# Patient Record
Sex: MALE | Race: WHITE
[De-identification: names, ages, dates, MRNs, and addresses within clinical notes are randomized per-mention and may not be internally consistent; named-entity substitution may affect disease eponyms.]

---

## 2017-01-05 ENCOUNTER — HOSPITAL ENCOUNTER (OUTPATIENT)
Dept: HOSPITAL 33 - ED | Age: 82
Setting detail: OBSERVATION
LOS: 1 days | Discharge: HOME | End: 2017-01-06
Attending: FAMILY MEDICINE | Admitting: FAMILY MEDICINE
Payer: MEDICARE

## 2017-01-05 DIAGNOSIS — R55: Primary | ICD-10-CM

## 2017-01-05 LAB
ANION GAP SERPL CALC-SCNC: 11.3 MEQ/L (ref 5–15)
BASOPHILS NFR BLD AUTO: 0.2 % (ref 0–0.4)
BUN SERPL-MCNC: 19 MG/DL (ref 9–20)
CHLORIDE SERPL-SCNC: 106 MEQ/L (ref 98–107)
CO2 SERPL-SCNC: 28 MEQ/L (ref 21–32)
GLUCOSE SERPL-MCNC: 139 MG/DL (ref 70–110)
MCH RBC QN AUTO: 32.7 PG (ref 26–32)
NEUTROPHILS NFR BLD AUTO: 88.4 % (ref 36–66)
PLATELET # BLD AUTO: 239 K/MM3 (ref 150–450)
POTASSIUM SERPLBLD-SCNC: 3.5 MEQ/L (ref 3.5–5.1)
RBC # BLD AUTO: 4.1 M/MM3 (ref 4.1–5.6)
SODIUM SERPL-SCNC: 142 MEQ/L (ref 136–145)
WBC # BLD AUTO: 11.4 K/MM3 (ref 4–10.5)

## 2017-01-05 PROCEDURE — 93268 ECG RECORD/REVIEW: CPT

## 2017-01-05 PROCEDURE — 71020: CPT

## 2017-01-05 PROCEDURE — 85025 COMPLETE CBC W/AUTO DIFF WBC: CPT

## 2017-01-05 PROCEDURE — 36415 COLL VENOUS BLD VENIPUNCTURE: CPT

## 2017-01-05 PROCEDURE — 36000 PLACE NEEDLE IN VEIN: CPT

## 2017-01-05 PROCEDURE — 84484 ASSAY OF TROPONIN QUANT: CPT

## 2017-01-05 PROCEDURE — 80048 BASIC METABOLIC PNL TOTAL CA: CPT

## 2017-01-05 PROCEDURE — 99284 EMERGENCY DEPT VISIT MOD MDM: CPT

## 2017-01-05 PROCEDURE — 93880 EXTRACRANIAL BILAT STUDY: CPT

## 2017-01-05 PROCEDURE — 70450 CT HEAD/BRAIN W/O DYE: CPT

## 2017-01-05 PROCEDURE — 96360 HYDRATION IV INFUSION INIT: CPT

## 2017-01-05 PROCEDURE — 93005 ELECTROCARDIOGRAM TRACING: CPT

## 2017-01-05 PROCEDURE — 81002 URINALYSIS NONAUTO W/O SCOPE: CPT
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
## 2017-01-06 VITALS — SYSTOLIC BLOOD PRESSURE: 131 MMHG | DIASTOLIC BLOOD PRESSURE: 75 MMHG | HEART RATE: 67 BPM | OXYGEN SATURATION: 95 %

## 2017-01-06 RX ADMIN — ACETAMINOPHEN PRN MG: 325 TABLET ORAL at 08:41

## 2017-01-06 RX ADMIN — ACETAMINOPHEN PRN MG: 325 TABLET ORAL at 12:26

## 2017-01-06 NOTE — PCM.DCORD
- Discharge


Discharge Date: 01/06/17


Condition: Good


Medications: 


Home Medications





Finasteride 5 mg*** [Proscar 5 MG***] 5 mg PO DAILY 03/03/14 [Confirmed 01/05/17

]


Hydrochlorothiazide 25 mg*** [hydroDIURIL 25 MG***] 25 mg PO DAILY 03/03/14 [

Confirmed 01/05/17]





Active Inpatient Medications





Acetaminophen (Tylenol 325 Mg***)  650 mg PO Q4H PRN PRN


   PRN Reason: PAIN AND/OR FEVER


   Stop: 02/04/17 20:29


   Last Admin: 01/06/17 12:26 Dose:  650 mg


Ibuprofen (Motrin 400 Mg***)  400 mg PO QID PRN PRN


   PRN Reason: PAIN


   Stop: 02/05/17 12:26


Sodium Chloride (Sodium Chloride 0.9% 10 Ml Flush Syringe)  10 ml IV Q8HT KAY


   Stop: 02/05/17 13:59


Sodium Chloride (Sodium Chloride 0.9% 10 Ml Flush Syringe)  10 ml IV PRN PRN


   PRN Reason: FLUSH


   Stop: 02/05/17 09:48








Follow up with: 


HARRY HEIN [Primary Care Provider] - 01/13/17 1:30 pm

## 2017-01-06 NOTE — XRAY
Indication: Syncope.



Two-dimensional sonogram and color Doppler imaging of the carotid arteries of

the neck performed.



Comparison: March 4, 2014



Examination of the right carotid circulation demonstrates stable minimal

heterogeneous plaquing at the level of the bulb extending into the origin of

the internal and external carotid arteries.  There is now mild focal calcified

plaquing in the distal internal carotid artery.  PSV of the CCA is 73 cm/s.

PSV of the ICA is 70 cm/s.  ICA/CCA ratio is 1.0.  Normal antegrade vertebral

artery flow.



Examination of the left carotid circulation demonstrates stable minimal

heterogeneous plaquing at the level of the bulb extending into the origin of

the internal and external carotid arteries.  Stable focal heterogeneous

plaquing in the distal internal carotid artery.  PSV of the CCA is 100 cm/s.

PSV of the ICA is 77 cm/s.  ICA/CCA ratio 0.8.  Normal antegrade vertebral

artery flow.



Impression: Again scattered plaquing in both carotid circulation as detailed

similar in appearance to the previous study.  Velocity measurements and ratios

again negative for hemodynamically significant flow-limiting stenosis.

## 2017-01-06 NOTE — XRAY
Indication: Amnesia.



Multiple contiguous axial images obtained through the head without contrast.



Comparison: March 3, 2014



Stable age-appropriate global atrophy and minimal periventricular degenerative

micro-ischemia bilaterally.  No acute intracranial hemorrhage, abnormal

extra-axial fluid collection, or mass effect.  Fourth ventricle is midline

without hydrocephalus.  Bony calvarium intact.  Visualized paranasal sinuses

and mastoid air cells are pneumatized and clear.



Impression: Stable nonacute senile brain.



Comment: Preliminary interpretation was made by [facility].  No discrepancy.



CT DI is 68.15

## 2017-01-06 NOTE — XRAY
Indication: Syncope.



Comparison: March 3, 2014



PA/lateral chest remains hyperinflated.  Minimal left costophrenic angle

atelectasis/scarring.  No focal infiltrate, consolidation, or large effusion.

Heart and mediastinal structures stable and within normal limits.  Bony thorax

intact again with mild osteopenia and degenerative changes.



Impression: Nonacute hyperinflated chest with chronic features.
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
## 2020-09-17 ENCOUNTER — HOSPITAL ENCOUNTER (EMERGENCY)
Dept: HOSPITAL 33 - ED | Age: 85
Discharge: HOME | End: 2020-09-17
Payer: MEDICARE

## 2020-09-17 VITALS — DIASTOLIC BLOOD PRESSURE: 52 MMHG | OXYGEN SATURATION: 91 % | SYSTOLIC BLOOD PRESSURE: 105 MMHG | HEART RATE: 88 BPM

## 2020-09-17 DIAGNOSIS — J34.89: ICD-10-CM

## 2020-09-17 DIAGNOSIS — R50.9: Primary | ICD-10-CM

## 2020-09-17 LAB
ALBUMIN SERPL-MCNC: 4.3 G/DL (ref 3.5–5)
ALP SERPL-CCNC: 59 U/L (ref 38–126)
ALT SERPL-CCNC: 12 U/L (ref 0–50)
ANION GAP SERPL CALC-SCNC: 13.3 MEQ/L (ref 5–15)
AST SERPL QL: 31 U/L (ref 17–59)
BASOPHILS # BLD AUTO: 0.01 10*3/UL (ref 0–0.4)
BASOPHILS NFR BLD AUTO: 0.2 % (ref 0–0.4)
BILIRUB BLD-MCNC: 0.9 MG/DL (ref 0.2–1.3)
BLD SMEAR INTERP: YES
BUN SERPL-MCNC: 22 MG/DL (ref 9–20)
CALCIUM SPEC-MCNC: 9.4 MG/DL (ref 8.4–10.2)
CHLORIDE SERPL-SCNC: 104 MMOL/L (ref 98–107)
CO2 SERPL-SCNC: 22 MMOL/L (ref 22–30)
CREAT SERPL-MCNC: 1.2 MG/DL (ref 0.66–1.25)
EOSINOPHIL # BLD AUTO: 0.03 10*3/UL (ref 0–0.5)
FLUAV AG NPH QL IA: NEGATIVE
FLUBV AG NPH QL IA: NEGATIVE
GFR SERPLBLD BASED ON 1.73 SQ M-ARVRAT: > 60 ML/MIN
GLUCOSE SERPL-MCNC: 105 MG/DL (ref 74–106)
GLUCOSE UR-MCNC: NEGATIVE MG/DL
HCT VFR BLD AUTO: 40.3 % (ref 42–50)
HGB BLD-MCNC: 13.5 GM/DL (ref 12.5–18)
LYMPHOCYTES # SPEC AUTO: 0.48 10*3/UL (ref 1–4.6)
MCH RBC QN AUTO: 33.8 PG (ref 26–32)
MCHC RBC AUTO-ENTMCNC: 33.5 G/DL (ref 32–36)
MONOCYTES # BLD AUTO: 0.11 10*3/UL (ref 0–1.3)
PLATELET # BLD AUTO: 240 K/MM3 (ref 150–450)
POTASSIUM SERPLBLD-SCNC: 3.8 MMOL/L (ref 3.5–5.1)
PROT SERPL-MCNC: 7.5 G/DL (ref 6.3–8.2)
PROT UR STRIP-MCNC: NEGATIVE MG/DL
RBC # BLD AUTO: 4 M/MM3 (ref 4.1–5.6)
RBC #/AREA URNS HPF: (no result) /HPF (ref 0–2)
RSV AG SPEC QL IA: NEGATIVE
S PYO AG SPEC QL: NOT DETECTED
SODIUM SERPL-SCNC: 136 MMOL/L (ref 137–145)
WBC # BLD AUTO: 5.2 K/MM3 (ref 4–10.5)
WBC #/AREA URNS HPF: (no result) /HPF (ref 0–5)

## 2020-09-17 PROCEDURE — 36415 COLL VENOUS BLD VENIPUNCTURE: CPT

## 2020-09-17 PROCEDURE — 96361 HYDRATE IV INFUSION ADD-ON: CPT

## 2020-09-17 PROCEDURE — 96360 HYDRATION IV INFUSION INIT: CPT

## 2020-09-17 PROCEDURE — 80053 COMPREHEN METABOLIC PANEL: CPT

## 2020-09-17 PROCEDURE — 93041 RHYTHM ECG TRACING: CPT

## 2020-09-17 PROCEDURE — 93005 ELECTROCARDIOGRAM TRACING: CPT

## 2020-09-17 PROCEDURE — 96374 THER/PROPH/DIAG INJ IV PUSH: CPT

## 2020-09-17 PROCEDURE — 87631 RESP VIRUS 3-5 TARGETS: CPT

## 2020-09-17 PROCEDURE — 71045 X-RAY EXAM CHEST 1 VIEW: CPT

## 2020-09-17 PROCEDURE — 99285 EMERGENCY DEPT VISIT HI MDM: CPT

## 2020-09-17 PROCEDURE — 86308 HETEROPHILE ANTIBODY SCREEN: CPT

## 2020-09-17 PROCEDURE — 81001 URINALYSIS AUTO W/SCOPE: CPT

## 2020-09-17 PROCEDURE — U0003 INFECTIOUS AGENT DETECTION BY NUCLEIC ACID (DNA OR RNA); SEVERE ACUTE RESPIRATORY SYNDROME CORONAVIRUS 2 (SARS-COV-2) (CORONAVIRUS DISEASE [COVID-19]), AMPLIFIED PROBE TECHNIQUE, MAKING USE OF HIGH THROUGHPUT TECHNOLOGIES AS DESCRIBED BY CMS-2020-01-R: HCPCS

## 2020-09-17 PROCEDURE — 36000 PLACE NEEDLE IN VEIN: CPT

## 2020-09-17 PROCEDURE — 82728 ASSAY OF FERRITIN: CPT

## 2020-09-17 PROCEDURE — 83615 LACTATE (LD) (LDH) ENZYME: CPT

## 2020-09-17 PROCEDURE — 87651 STREP A DNA AMP PROBE: CPT

## 2020-09-17 PROCEDURE — 87040 BLOOD CULTURE FOR BACTERIA: CPT

## 2020-09-17 PROCEDURE — 85025 COMPLETE CBC W/AUTO DIFF WBC: CPT

## 2020-09-17 PROCEDURE — 83605 ASSAY OF LACTIC ACID: CPT

## 2020-09-17 NOTE — ERPHSYRPT
- History of Present Illness


Time Seen by Provider: 09/17/20 13:00


Source: patient


Exam Limitations: no limitations


Physician History: 





This is an 88-year-old white male with a history of hypertension presents with 

fever, runny nose, nausea and mild cough.  He has not been exposed to anyone 

with known COVID 19+ test results.  Symptoms began today.


Fever Severity: moderate


Associated Symptoms: cough, nausea/vomiting (Nausea but no vomiting), rhinorrhea


Allergies/Adverse Reactions: 








No Known Drug Allergies Allergy (Verified 01/05/17 17:50)


   





Home Medications: 








Hydrochlorothiazide 25 mg*** [hydroDIURIL 25 MG***] 25 mg PO DAILY 03/03/14 

[History]


Aspirin [Aspirin EC] 81 mg PO DAILY 09/17/20 [History]


Donepezil HCl 10 mg PO DAILY 09/17/20 [History]


Finasteride 1 tab PO DAILY 09/17/20 [History]





Hx Tetanus, Diphtheria Vaccination/Date Given: Yes


Hx Influenza Vaccination/Date Given: No


Hx Pneumococcal Vaccination/Date Given: No





Travel Risk





- International Travel


Have you traveled outside of the country in past 3 weeks: No





- Coronavirus Screening


Are you exhibiting any of the following symptoms?: No


Close contact with a COVID-19 positive Pt in past 14-21 Days: No





- Review of Systems


Constitutional: Fever


Eyes: No Symptoms


Ears, Nose, & Throat: Nose Discharge (Ear)


Respiratory: Cough (Old)


Cardiac: No Symptoms, No Chest Pain


Abdominal/Gastrointestinal: Nausea, No Abdominal Pain, No Vomiting, No Diarrhea


Genitourinary Symptoms: No Symptoms


Musculoskeletal: No Symptoms


Skin: No Symptoms


Neurological: No Symptoms


Psychological: No Symptoms


Endocrine: No Symptoms


Hematologic/Lymphatic: No Symptoms


Immunological/Allergic: No Symptoms


All Other Systems: Reviewed and Negative





- Past Medical History


Pertinent Past Medical History: Yes


Neurological History: No Pertinent History


ENT History: No Pertinent History


Cardiac History: No Pertinent History


Respiratory History: No Pertinent History


Endocrine Medical History: No Pertinent History


Musculoskeletal History: No Pertinent History


GI Medical History: No Pertinent History


 History: Bladder Cancer


Psycho-Social History: No Pertinent History


Male Reproductive Disorders: Prostate Problems


Other Medical History: SKIN CA





- Past Surgical History


Past Surgical History: No


Neuro Surgical History: No Pertinent History


Cardiac: No Pertinent History


Respiratory: No Pertinent History


Gastrointestinal: No Pertinent History


Genitourinary: No Pertinent History


Musculoskeletal: No Pertinent History


Male Surgical History: No Pertinent History


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 

OTHER PLACES THROUGHOUT THE YEARS





- Social History


Smoking Status: Former smoker


Exposure to second hand smoke: No


Drug Use: none


Patient Lives Alone: No





- Nursing Vital Signs


Nursing Vital Signs: 


                               Initial Vital Signs











Temperature  100.8 F   09/17/20 12:59


 


Pulse Rate  111 H  09/17/20 12:59


 


Respiratory Rate  38 H  09/17/20 12:59


 


Blood Pressure  110/67   09/17/20 12:59


 


O2 Sat by Pulse Oximetry  90 L  09/17/20 12:59








                                   Pain Scale











Pain Intensity                 0

















- Physical Exam


General Appearance: no apparent distress, alert


Eye Exam: PERRL/EOMI, eyes nml inspection


ENT Exam: normal ENT inspection, no apparent trauma, hearing grossly normal, TMs

 normal, pharynx normal


Neck Exam: normal inspection, non-tender, supple, full range of motion, trachea 

midline


Respiratory Exam: normal breath sounds, chest non-tender, lungs clear, no 

respiratory distress, no accessory muscle use


Cardiovascular/Chest Exam: tachycardia


Gastrointestinal/Abdominal Exam: soft, non tender, no distention, no mass, no 

guarding


Rectal Exam: not done


Extremity Exam: non-tender, normal range of motion, normal inspection, no pedal 

edema, pelvis stable


Neurologic Exam: alert, oriented x 3, cooperative, CNs II-XII nml as tested, 

normal mood/affect, nml cerebellar function, nml station & gait, sensation nml


Skin Exam: normal color, warm, dry


Lymphatic: No adenopathy


**SpO2 Interpretation**: normal


O2 Delivery: Room Air





- Course


Nursing assessment & vital signs reviewed: Yes


EKG Interpreted by Me: RATE ([id number]), Sinus Rhythm, NORMAL AXIS, NORMAL INTERVALS, 

NORMAL QRS, Non-specific ST Changes


Ordered Tests: 


                               Active Orders 24 hr











 Category Date Time Status


 


 Cardiac Monitor STAT Care  09/17/20 13:14 Active


 


 EKG-ER Only STAT Care  09/17/20 13:13 Active


 


 IV Insertion STAT Care  09/17/20 13:13 Active


 


 Isolation, Initiate & Maintain STAT Care  09/17/20 13:16 Active


 


 CHEST 1 VIEW (PORTABLE) Stat Exams  09/17/20 13:14 Completed


 


 BLOOD CULTURE Stat Lab  09/17/20 13:50 Received


 


 CBC W DIFF Stat Lab  09/17/20 13:15 Completed


 


 CMP Stat Lab  09/17/20 13:15 Completed


 


 Ferritin Stat Lab  09/17/20 13:15 Completed


 


 LDH-LACTATE DEHYDROGENASE Stat Lab  09/17/20 13:15 Completed


 


 Lactic Acid Stat Lab  09/17/20 13:30 Completed


 


 Lactic Acid Stat Lab  09/17/20 15:33 Completed


 


 Mono Screen Stat Lab  09/17/20 13:15 Completed


 


 UA W/RFX UR CULTURE Stat Lab  09/17/20 14:02 Completed








Medication Summary











Generic Name Dose Route Start Last Admin





  Trade Name Freq  PRN Reason Stop Dose Admin


 


Sodium Chloride  1,000 mls @ 50 mls/hr  09/17/20 13:15  09/17/20 14:40





  Sodium Chloride 0.9% 1000 Ml  IV  10/17/20 13:14  50 mls/hr





  .Q20H KAY   Administration














Discontinued Medications














Generic Name Dose Route Start Last Admin





  Trade Name Freq  PRN Reason Stop Dose Admin


 


Acetaminophen  650 mg  09/17/20 13:13  09/17/20 13:24





  Tylenol 325 Mg***  PO  09/17/20 13:14  650 mg





  STAT STA   Administration


 


Acetaminophen  Confirm  09/17/20 13:20 





  Tylenol 325 Mg***  Administered  09/17/20 13:21 





  Dose  





  650 mg  





  .ROUTE  





  .STK-MED ONE  


 


Sodium Chloride  500 mls @ 500 mls/hr  09/17/20 13:15  09/17/20 14:28





  Sodium Chloride 0.9% 500 Ml  IV  09/17/20 14:14  Infused





  .Q1H ONE   Infusion


 


Sodium Chloride  Confirm  09/17/20 13:20 





  Sodium Chloride 0.9% 500 Ml  Administered  09/17/20 13:21 





  Dose  





  500 mls @ ud  





  IV  





  .STK-MED ONE  


 


Ondansetron HCl  4 mg  09/17/20 13:13  09/17/20 13:24





  Zofran 4 Mg/2 Ml Vial**  IV  09/17/20 13:14  4 mg





  STAT STA   Administration


 


Ondansetron HCl  Confirm  09/17/20 13:20 





  Zofran 4 Mg/2 Ml Vial**  Administered  09/17/20 13:21 





  Dose  





  4 mg  





  .ROUTE  





  .STK-MED ONE  











Lab/Rad Data: 


                           Laboratory Result Diagrams





                                 09/17/20 13:15 





                                 09/17/20 13:15 





                               Laboratory Results











  09/17/20 09/17/20 09/17/20 Range/Units





  15:33 14:02 13:50 


 


WBC     (4.0-10.5)  K/mm3


 


RBC     (4.1-5.6)  M/mm3


 


Hgb     (12.5-18.0)  gm/dl


 


Hct     (42-50)  %


 


MCV     ()  fl


 


MCH     (26-32)  pg


 


MCHC     (32-36)  g/dl


 


RDW     (11.5-14.0)  %


 


Plt Count     (150-450)  K/mm3


 


MPV     (7.5-11.0)  fl


 


Gran %     (36.0-66.0)  %


 


Eos # (Auto)     (0-0.5)  


 


Absolute Lymphs (auto)     (1.0-4.6)  


 


Absolute Monos (auto)     (0.0-1.3)  


 


Lymphocytes %     (24.0-44.0)  %


 


Monocytes %     (0.0-12.0)  %


 


Eosinophils %     (0.00-5.0)  %


 


Basophils %     (0.0-0.4)  %


 


Absolute Granulocytes     (1.4-6.9)  


 


Basophils #     (0-0.4)  


 


Sodium     (137-145)  mmol/L


 


Potassium     (3.5-5.1)  mmol/L


 


Chloride     ()  mmol/L


 


Carbon Dioxide     (22-30)  mmol/L


 


Anion Gap     (5-15)  MEQ/L


 


BUN     (9-20)  mg/dL


 


Creatinine     (0.66-1.25)  mg/dL


 


Estimated GFR     ML/MIN


 


Glucose     ()  mg/dL


 


Lactic Acid  1.0    (0.4-2.0)  


 


Calcium     (8.4-10.2)  mg/dL


 


Ferritin     (17.9-464)  ng/mL


 


Total Bilirubin     (0.2-1.3)  mg/dL


 


AST     (17-59)  U/L


 


ALT     (0-50)  U/L


 


Alkaline Phosphatase     ()  U/L


 


Lactate Dehydrogenase     (120-246)  U/L


 


Serum Total Protein     (6.3-8.2)  g/dL


 


Albumin     (3.5-5.0)  g/dL


 


Urine Color   YELLOW   (YELLOW)  


 


Urine Appearance   CLEAR   (CLEAR)  


 


Urine pH   5.0   (5-6)  


 


Ur Specific Gravity   1.012   (1.005-1.025)  


 


Urine Protein   NEGATIVE   (Negative)  


 


Urine Ketones   NEGATIVE   (NEGATIVE)  


 


Urine Blood   NEGATIVE   (0-5)  Phillip/ul


 


Urine Nitrite   NEGATIVE   (NEGATIVE)  


 


Urine Bilirubin   NEGATIVE   (NEGATIVE)  


 


Urine Urobilinogen   NEGATIVE   (0-1)  mg/dL


 


Ur Leukocyte Esterase   NEGATIVE   (NEGATIVE)  


 


Urine WBC (Auto)   NONE   (0-5)  /HPF


 


Urine RBC (Auto)   0-2   (0-2)  /HPF


 


U Epithel Cells (Auto)   NONE   (FEW)  /HPF


 


Urine Bacteria (Auto)   NONE   (NEGATIVE)  /HPF


 


Urine Mucus (Auto)   SLIGHT   (NEGATIVE)  /HPF


 


Urine Culture Reflexed   NO   (NO)  


 


Urine Glucose   NEGATIVE   (NEGATIVE)  mg/dL


 


Monoscreen     (Negative)  


 


Influenza Type A Ag    NEGATIVE  (NEGATIVE)  


 


Influenza Type B Ag    NEGATIVE  (NEGATIVE)  


 


RSV (PCR)    NEGATIVE  (Negative)  


 


Group A Strep Antibody    NOT DETECTED  (NEGATIVE)  














  09/17/20 09/17/20 09/17/20 Range/Units





  13:30 13:15 13:15 


 


WBC     (4.0-10.5)  K/mm3


 


RBC     (4.1-5.6)  M/mm3


 


Hgb     (12.5-18.0)  gm/dl


 


Hct     (42-50)  %


 


MCV     ()  fl


 


MCH     (26-32)  pg


 


MCHC     (32-36)  g/dl


 


RDW     (11.5-14.0)  %


 


Plt Count     (150-450)  K/mm3


 


MPV     (7.5-11.0)  fl


 


Gran %     (36.0-66.0)  %


 


Eos # (Auto)     (0-0.5)  


 


Absolute Lymphs (auto)     (1.0-4.6)  


 


Absolute Monos (auto)     (0.0-1.3)  


 


Lymphocytes %     (24.0-44.0)  %


 


Monocytes %     (0.0-12.0)  %


 


Eosinophils %     (0.00-5.0)  %


 


Basophils %     (0.0-0.4)  %


 


Absolute Granulocytes     (1.4-6.9)  


 


Basophils #     (0-0.4)  


 


Sodium     (137-145)  mmol/L


 


Potassium     (3.5-5.1)  mmol/L


 


Chloride     ()  mmol/L


 


Carbon Dioxide     (22-30)  mmol/L


 


Anion Gap     (5-15)  MEQ/L


 


BUN     (9-20)  mg/dL


 


Creatinine     (0.66-1.25)  mg/dL


 


Estimated GFR     ML/MIN


 


Glucose     ()  mg/dL


 


Lactic Acid  2.6 H    (0.4-2.0)  


 


Calcium     (8.4-10.2)  mg/dL


 


Ferritin    22.0  (17.9-464)  ng/mL


 


Total Bilirubin     (0.2-1.3)  mg/dL


 


AST     (17-59)  U/L


 


ALT     (0-50)  U/L


 


Alkaline Phosphatase     ()  U/L


 


Lactate Dehydrogenase     (120-246)  U/L


 


Serum Total Protein     (6.3-8.2)  g/dL


 


Albumin     (3.5-5.0)  g/dL


 


Urine Color     (YELLOW)  


 


Urine Appearance     (CLEAR)  


 


Urine pH     (5-6)  


 


Ur Specific Gravity     (1.005-1.025)  


 


Urine Protein     (Negative)  


 


Urine Ketones     (NEGATIVE)  


 


Urine Blood     (0-5)  Phillip/ul


 


Urine Nitrite     (NEGATIVE)  


 


Urine Bilirubin     (NEGATIVE)  


 


Urine Urobilinogen     (0-1)  mg/dL


 


Ur Leukocyte Esterase     (NEGATIVE)  


 


Urine WBC (Auto)     (0-5)  /HPF


 


Urine RBC (Auto)     (0-2)  /HPF


 


U Epithel Cells (Auto)     (FEW)  /HPF


 


Urine Bacteria (Auto)     (NEGATIVE)  /HPF


 


Urine Mucus (Auto)     (NEGATIVE)  /HPF


 


Urine Culture Reflexed     (NO)  


 


Urine Glucose     (NEGATIVE)  mg/dL


 


Monoscreen   NEGATIVE   (Negative)  


 


Influenza Type A Ag     (NEGATIVE)  


 


Influenza Type B Ag     (NEGATIVE)  


 


RSV (PCR)     (Negative)  


 


Group A Strep Antibody     (NEGATIVE)  














  09/17/20 09/17/20 09/17/20 Range/Units





  13:15 13:15 13:15 


 


WBC    5.2  (4.0-10.5)  K/mm3


 


RBC    4.00 L  (4.1-5.6)  M/mm3


 


Hgb    13.5  (12.5-18.0)  gm/dl


 


Hct    40.3 L  (42-50)  %


 


MCV    100.8 H  ()  fl


 


MCH    33.8 H  (26-32)  pg


 


MCHC    33.5  (32-36)  g/dl


 


RDW    13.0  (11.5-14.0)  %


 


Plt Count    240  (150-450)  K/mm3


 


MPV    9.1  (7.5-11.0)  fl


 


Gran %    87.9 H  (36.0-66.0)  %


 


Eos # (Auto)    0.03  (0-0.5)  


 


Absolute Lymphs (auto)    0.48 L  (1.0-4.6)  


 


Absolute Monos (auto)    0.11  (0.0-1.3)  


 


Lymphocytes %    9.2 L  (24.0-44.0)  %


 


Monocytes %    2.1  (0.0-12.0)  %


 


Eosinophils %    0.6  (0.00-5.0)  %


 


Basophils %    0.2  (0.0-0.4)  %


 


Absolute Granulocytes    4.56  (1.4-6.9)  


 


Basophils #    0.01  (0-0.4)  


 


Sodium   136 L   (137-145)  mmol/L


 


Potassium   3.8   (3.5-5.1)  mmol/L


 


Chloride   104   ()  mmol/L


 


Carbon Dioxide   22   (22-30)  mmol/L


 


Anion Gap   13.3   (5-15)  MEQ/L


 


BUN   22 H   (9-20)  mg/dL


 


Creatinine   1.20   (0.66-1.25)  mg/dL


 


Estimated GFR   > 60.0   ML/MIN


 


Glucose   105   ()  mg/dL


 


Lactic Acid     (0.4-2.0)  


 


Calcium   9.4   (8.4-10.2)  mg/dL


 


Ferritin     (17.9-464)  ng/mL


 


Total Bilirubin   0.90   (0.2-1.3)  mg/dL


 


AST   31   (17-59)  U/L


 


ALT   12   (0-50)  U/L


 


Alkaline Phosphatase   59   ()  U/L


 


Lactate Dehydrogenase  205    (120-246)  U/L


 


Serum Total Protein   7.5   (6.3-8.2)  g/dL


 


Albumin   4.3   (3.5-5.0)  g/dL


 


Urine Color     (YELLOW)  


 


Urine Appearance     (CLEAR)  


 


Urine pH     (5-6)  


 


Ur Specific Gravity     (1.005-1.025)  


 


Urine Protein     (Negative)  


 


Urine Ketones     (NEGATIVE)  


 


Urine Blood     (0-5)  Phillip/ul


 


Urine Nitrite     (NEGATIVE)  


 


Urine Bilirubin     (NEGATIVE)  


 


Urine Urobilinogen     (0-1)  mg/dL


 


Ur Leukocyte Esterase     (NEGATIVE)  


 


Urine WBC (Auto)     (0-5)  /HPF


 


Urine RBC (Auto)     (0-2)  /HPF


 


U Epithel Cells (Auto)     (FEW)  /HPF


 


Urine Bacteria (Auto)     (NEGATIVE)  /HPF


 


Urine Mucus (Auto)     (NEGATIVE)  /HPF


 


Urine Culture Reflexed     (NO)  


 


Urine Glucose     (NEGATIVE)  mg/dL


 


Monoscreen     (Negative)  


 


Influenza Type A Ag     (NEGATIVE)  


 


Influenza Type B Ag     (NEGATIVE)  


 


RSV (PCR)     (Negative)  


 


Group A Strep Antibody     (NEGATIVE)  














- Progress


Progress: improved, re-examined


Progress Note: 





09/17/20 16:50


Chest x-ray shows no acute pulmonary process.


09/17/20 17:00


Patient states that he feels great.  He has no chest pain he is not short of 

breath.  He wants to go home.  His work-up does not show anything acute at this 

time other than his fever was elevated and his room air oxygenation was 93 at 

the time of discharge.  His temperature was improved down to 99 F.


Counseled pt/family regarding: lab results, diagnosis, need for follow-up, rad 

results





- Departure


Departure Disposition: Home


Clinical Impression: 


 Fever, Rhinorrhea





Condition: Stable


Critical Care Time: No


Referrals: 


HARRY HEIN [Primary Care Provider] - 


Additional Instructions: 


Drink plenty of fluids.  Use Tylenol and ibuprofen if not allergic or 

contraindicated for fever control.  Return to the emergency department if your 

symptoms worsen.  Follow-up with your primary care physician on an as-needed 

basis.  Quarantine yourself until the results of the COVID-19 test return

## 2020-09-17 NOTE — XRAY
Indication: Fever, chills, and shakes.



Comparison: January 5, 2017.



Portable apical lordotic chest remains hyperinflated and clear.  Heart and

mediastinal structures within normal limits.  Descending aorta remains

tortuous.  Bony thorax intact again with mild osteopenia and degenerative

changes.



Impression: Continued nonacute hyperinflated chest with chronic features.

## 2021-04-25 ENCOUNTER — HOSPITAL ENCOUNTER (EMERGENCY)
Dept: HOSPITAL 33 - ED | Age: 86
Discharge: HOME | End: 2021-04-25
Payer: MEDICARE

## 2021-04-25 VITALS — HEART RATE: 61 BPM | DIASTOLIC BLOOD PRESSURE: 90 MMHG | OXYGEN SATURATION: 95 % | SYSTOLIC BLOOD PRESSURE: 141 MMHG

## 2021-04-25 DIAGNOSIS — R42: Primary | ICD-10-CM

## 2021-04-25 LAB
ALBUMIN SERPL-MCNC: 4.3 G/DL (ref 3.5–5)
ALP SERPL-CCNC: 59 U/L (ref 38–126)
ALT SERPL-CCNC: 13 U/L (ref 0–50)
ANION GAP SERPL CALC-SCNC: 10.2 MEQ/L (ref 5–15)
AST SERPL QL: 35 U/L (ref 17–59)
BASOPHILS # BLD AUTO: 0.03 10*3/UL (ref 0–0.4)
BASOPHILS NFR BLD AUTO: 0.5 % (ref 0–0.4)
BILIRUB BLD-MCNC: 0.8 MG/DL (ref 0.2–1.3)
BUN SERPL-MCNC: 18 MG/DL (ref 9–20)
CALCIUM SPEC-MCNC: 9.9 MG/DL (ref 8.4–10.2)
CHLORIDE SERPL-SCNC: 100 MMOL/L (ref 98–107)
CO2 SERPL-SCNC: 31 MMOL/L (ref 22–30)
CREAT SERPL-MCNC: 1.18 MG/DL (ref 0.66–1.25)
EOSINOPHIL # BLD AUTO: 0.04 10*3/UL (ref 0–0.5)
GFR SERPLBLD BASED ON 1.73 SQ M-ARVRAT: > 60 ML/MIN
GLUCOSE SERPL-MCNC: 134 MG/DL (ref 74–106)
HCT VFR BLD AUTO: 42.7 % (ref 42–50)
HGB BLD-MCNC: 14.2 GM/DL (ref 12.5–18)
LYMPHOCYTES # SPEC AUTO: 1.19 10*3/UL (ref 1–4.6)
MCH RBC QN AUTO: 33.3 PG (ref 26–32)
MCHC RBC AUTO-ENTMCNC: 33.3 G/DL (ref 32–36)
MONOCYTES # BLD AUTO: 0.44 10*3/UL (ref 0–1.3)
PLATELET # BLD AUTO: 292 K/MM3 (ref 150–450)
POTASSIUM SERPLBLD-SCNC: 3.6 MMOL/L (ref 3.5–5.1)
PROT SERPL-MCNC: 7.9 G/DL (ref 6.3–8.2)
RBC # BLD AUTO: 4.26 M/MM3 (ref 4.1–5.6)
SODIUM SERPL-SCNC: 138 MMOL/L (ref 137–145)
WBC # BLD AUTO: 6.2 K/MM3 (ref 4–10.5)

## 2021-04-25 PROCEDURE — 85652 RBC SED RATE AUTOMATED: CPT

## 2021-04-25 PROCEDURE — 83605 ASSAY OF LACTIC ACID: CPT

## 2021-04-25 PROCEDURE — 84484 ASSAY OF TROPONIN QUANT: CPT

## 2021-04-25 PROCEDURE — 70450 CT HEAD/BRAIN W/O DYE: CPT

## 2021-04-25 PROCEDURE — 96374 THER/PROPH/DIAG INJ IV PUSH: CPT

## 2021-04-25 PROCEDURE — 93005 ELECTROCARDIOGRAM TRACING: CPT

## 2021-04-25 PROCEDURE — 36415 COLL VENOUS BLD VENIPUNCTURE: CPT

## 2021-04-25 PROCEDURE — 85025 COMPLETE CBC W/AUTO DIFF WBC: CPT

## 2021-04-25 PROCEDURE — 80053 COMPREHEN METABOLIC PANEL: CPT

## 2021-04-25 PROCEDURE — 71045 X-RAY EXAM CHEST 1 VIEW: CPT

## 2021-04-25 PROCEDURE — 99284 EMERGENCY DEPT VISIT MOD MDM: CPT

## 2021-04-25 PROCEDURE — 96375 TX/PRO/DX INJ NEW DRUG ADDON: CPT

## 2021-04-25 NOTE — XRAY
Indication: Vertigo.



Comparison: September 17, 2020.



Portable chest remains hyperinflated and clear with new mild left

hemidiaphragm elevation.  Heart not enlarged again with tortuous descending

aorta.  Bony thorax intact again with mild osteopenia and degenerative changes.



Impression: Nonacute chest with chronic features.

## 2021-04-25 NOTE — XRAY
Indication: Vertigo.



Multiple contiguous axial images obtained through the head without contrast.



Comparison: January 5, 2017.



There remains age-appropriate global atrophy and mild periventricular

degenerative micro-ischemia.  Right vertex demonstrates a 1.8 cm meningioma

now partially calcified.  No acute intracranial hemorrhage, abnormal

extra-axial fluid collection, or mass effect.  Fourth ventricle is midline

without hydrocephalus.  Bony calvarium intact.  Visualized paranasal sinuses

and mastoid air cells are clear.



Impression:

1.  Small right vertex meningioma stable in size but now partially calcified.

2.  Atrophy and degenerative micro-ischemia within normal limits for patient's

age.

3.  No acute intracranial abnormalities.



Comment: Preliminary interpretation was made by [facility].  No critical discrepancy.

## 2021-04-25 NOTE — ERPHSYRPT
- History of Present Illness


Time Seen by Provider: 04/25/21 14:45


Source: patient


Exam Limitations: no limitations


Patient Subjective Stated Complaint: dizziness


Triage Nursing Assessment: Patient brought back to ED via w/c and transferred 

self to bed. Patient A+O X3. Patient's skin pink, warm and dry. Patient 

complains of becoming dizzy at 0200 and has been dizzy since. Patient complains 

of nausea but no vomiting. Patient denies pain or discomfort.


Physician History: 





Patient is an 89-year-old male who presents with a sudden onset of profound 

vertigo at 2 AM today.  He has episodes of intense vertigo sweating nausea and 

vomiting.  He does have a history of similar episodes like this for the past 6 

months and his son reports that his balance has been deteriorating fairly 

rapidly.  Patient denies any chest pain headache unilateral weakness or loss of 

sensation.


Timing/Duration: today


Severity: severe


Deficits: off balance


Baseline/Normal Cognition: alert oriented x 3


Current Cognition: alert oriented x 3


Baseline Gait: walks w/o assistance


Associated Symptoms: nausea, vomiting


Allergies/Adverse Reactions: 








No Known Drug Allergies Allergy (Verified 04/25/21 14:36)


   





Home Medications: 








Hydrochlorothiazide 25 mg*** [hydroDIURIL 25 MG***] 25 mg PO DAILY 03/03/14 

[History]


Aspirin [Aspirin EC] 81 mg PO DAILY 09/17/20 [History]


Donepezil HCl 10 mg PO DAILY 09/17/20 [History]


Finasteride 1 tab PO DAILY 09/17/20 [History]





Hx Tetanus, Diphtheria Vaccination/Date Given: Yes


Hx Influenza Vaccination/Date Given: Yes


Hx Pneumococcal Vaccination/Date Given: No


Immunizations Up to Date: Yes





Travel Risk





- International Travel


Have you traveled outside of the country in past 3 weeks: No





- Coronavirus Screening


Are you exhibiting any of the following symptoms?: No


Close contact with a COVID-19 positive Pt in past 14-21 Days: No





- Vaccine Status


Have you recieved a Covid-19 vaccination: Yes


: Moderna





- Vaccination Dates


Date of 2cond Vaccination (if applicable): unknown


Dates if Unknown: unknown of dates





- Review of Systems


Constitutional: No Fever, No Chills


Eyes: No Symptoms


Ears, Nose, & Throat: No Symptoms


Respiratory: No Cough, No Dyspnea


Cardiac: No Chest Pain, No Edema, No Syncope


Abdominal/Gastrointestinal: Nausea, Vomiting, No Abdominal Pain, No Diarrhea


Genitourinary Symptoms: No Dysuria


Musculoskeletal: No Back Pain, No Neck Pain


Skin: No Rash


Neurological: Dizziness, Vertigo, No Focal Weakness, No Sensory Changes


Psychological: No Symptoms


Endocrine: No Symptoms


Hematologic/Lymphatic: No Symptoms


Immunological/Allergic: No Symptoms


All Other Systems: Reviewed and Negative





- Past Medical History


Pertinent Past Medical History: Yes


Neurological History: No Pertinent History


ENT History: No Pertinent History


Cardiac History: No Pertinent History


Respiratory History: No Pertinent History


Endocrine Medical History: No Pertinent History


Musculoskeletal History: No Pertinent History


GI Medical History: No Pertinent History


 History: Bladder Cancer


Psycho-Social History: No Pertinent History


Male Reproductive Disorders: Prostate Problems


Other Medical History: SKIN CA





- Past Surgical History


Past Surgical History: No


Neuro Surgical History: No Pertinent History


Cardiac: No Pertinent History


Respiratory: No Pertinent History


Gastrointestinal: No Pertinent History


Genitourinary: No Pertinent History


Musculoskeletal: No Pertinent History


Male Surgical History: No Pertinent History


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 

OTHER PLACES THROUGHOUT THE YEARS





- Social History


Smoking Status: Former smoker


Exposure to second hand smoke: No


Drug Use: none


Patient Lives Alone: No





- Nursing Vital Signs


Nursing Vital Signs: 





                               Initial Vital Signs











Temperature  97.6 F   04/25/21 14:37


 


Pulse Rate  63   04/25/21 14:37


 


Respiratory Rate  18   04/25/21 14:37


 


Blood Pressure  163/115   04/25/21 14:37


 


O2 Sat by Pulse Oximetry  97   04/25/21 14:37








                                   Pain Scale











Pain Intensity                 0

















- [place] Coma Scale


Best Eye Response (Aranza): (4) open spontaneously


Best Verbal Response ([place]anza): (5) oriented


Best Motor Response (Aranza): (6) obeys commands


[place] Total: 15





- Physical Exam


General Appearance: no apparent distress, alert


Eye Exam: bilateral eye: PERRL, EOMI (Segments noted with change in head 

position especially with patient supine and head to the left)


Ears, Nose, Throat Exam: normal ENT inspection, moist mucous membranes


Neck Exam: normal inspection, non-tender, supple


Respiratory: normal breath sounds, lungs clear, airway intact, No respiratory 

distress


Cardiovascular: regular rate/rhythm, No edema


Gastrointestinal: soft, No tenderness, No distention


Back Exam: normal inspection


Extremity Exam: normal inspection, No pedal edema


Mental Status: alert, oriented x 3


CNs Exam: tongue midline


Coordination/Gait: normal finger to nose, normal gait, normal cerebellar 

function


Motor/Sensory: no motor deficit


Skin Exam: normal color, warm, dry, No rash


**SpO2 Interpretation**: normal


SpO2: 97


O2 Delivery: Room Air





- Course


Nursing assessment & vital signs reviewed: Yes


EKG Interpreted by Me: RATE ([id number]), Sinus Rhythm, NORMAL AXIS, NORMAL INTERVALS, 

NORMAL QRS





- CT Exams


  ** Head


CT Interpretation: Tele-radiologist Report


Ordered Tests: 





                               Active Orders 24 hr











 Category Date Time Status


 


 EKG-ER Only STAT Care  04/25/21 14:38 Active


 


 CHEST 1 VIEW (PORTABLE) Stat Exams  04/25/21 14:39 Taken


 


 HEAD WITHOUT CONTRAST [CT] Stat Exams  04/25/21 14:37 Taken


 


 CBC W DIFF Stat Lab  04/25/21 14:47 Completed


 


 CMP Stat Lab  04/25/21 14:47 Completed


 


 Erythrocyte Sedimentation Rate Stat Lab  04/25/21 14:47 Completed


 


 Lactic Acid Urgent Lab  04/25/21 15:08 Completed


 


 TROPONIN Q3H Lab  04/25/21 14:47 Completed


 


 TROPONIN Q3H Lab  04/25/21 17:45 Ordered


 


 TROPONIN Q3H Lab  04/25/21 20:45 Ordered


 


 TROPONIN Q3H Lab  04/25/21 23:45 Ordered


 


 TROPONIN Q3H Lab  04/26/21 02:45 Ordered


 


 UA W/RFX UR CULTURE Stat Lab  04/25/21 14:38 Ordered








Medication Summary














Discontinued Medications














Generic Name Dose Route Start Last Admin





  Trade Name Freq  PRN Reason Stop Dose Admin


 


Diazepam  2.5 mg  04/25/21 14:40  04/25/21 14:55





  Valium 10 Mg/2 Ml Syringe***  IV  04/25/21 14:41  2.5 mg





  STAT ONE   Administration


 


Diazepam  Confirm  04/25/21 14:53 





  Valium 10 Mg/2 Ml Syringe***  Administered  04/25/21 14:54 





  Dose  





  10 mg  





  .ROUTE  





  .STK-MED ONE  


 


Prochlorperazine Edisylate  5 mg  04/25/21 14:40  04/25/21 14:54





  Compazine 10 Mg/2 Ml***  IV  04/25/21 14:41  5 mg





  STAT ONE   Administration


 


Prochlorperazine Edisylate  Confirm  04/25/21 14:53 





  Compazine 10 Mg/2 Ml***  Administered  04/25/21 14:54 





  Dose  





  10 mg  





  .ROUTE  





  .STK-MED ONE  











Lab/Rad Data: 





                           Laboratory Result Diagrams





                                 04/25/21 14:47 





                                 04/25/21 14:47 





                               Laboratory Results











  04/25/21 04/25/21 04/25/21 Range/Units





  15:08 14:47 14:47 


 


WBC     (4.0-10.5)  K/mm3


 


RBC     (4.1-5.6)  M/mm3


 


Hgb     (12.5-18.0)  gm/dl


 


Hct     (42-50)  %


 


MCV     ()  fl


 


MCH     (26-32)  pg


 


MCHC     (32-36)  g/dl


 


RDW     (11.5-14.0)  %


 


Plt Count     (150-450)  K/mm3


 


MPV     (7.5-11.0)  fl


 


Gran %     (36.0-66.0)  %


 


Eos # (Auto)     (0-0.5)  


 


Absolute Lymphs (auto)     (1.0-4.6)  


 


Absolute Monos (auto)     (0.0-1.3)  


 


Lymphocytes %     (24.0-44.0)  %


 


Monocytes %     (0.0-12.0)  %


 


Eosinophils %     (0.00-5.0)  %


 


Basophils %     (0.0-0.4)  %


 


Absolute Granulocytes     (1.4-6.9)  


 


Basophils #     (0-0.4)  


 


ESR     (0-15)  mm/hr


 


Sodium    138  (137-145)  mmol/L


 


Potassium    3.6  (3.5-5.1)  mmol/L


 


Chloride    100  ()  mmol/L


 


Carbon Dioxide    31 H  (22-30)  mmol/L


 


Anion Gap    10.2  (5-15)  MEQ/L


 


BUN    18  (9-20)  mg/dL


 


Creatinine    1.18  (0.66-1.25)  mg/dL


 


Estimated GFR    > 60.0  ML/MIN


 


Glucose    134 H  ()  mg/dL


 


Lactic Acid  1.0    (0.4-2.0)  


 


Calcium    9.9  (8.4-10.2)  mg/dL


 


Total Bilirubin    0.80  (0.2-1.3)  mg/dL


 


AST    35  (17-59)  U/L


 


ALT    13  (0-50)  U/L


 


Alkaline Phosphatase    59  ()  U/L


 


Troponin I   < 0.012   (0.000-0.034)  ng/mL


 


Serum Total Protein    7.9  (6.3-8.2)  g/dL


 


Albumin    4.3  (3.5-5.0)  g/dL














  04/25/21 Range/Units





  14:47 


 


WBC  6.2  (4.0-10.5)  K/mm3


 


RBC  4.26  (4.1-5.6)  M/mm3


 


Hgb  14.2  (12.5-18.0)  gm/dl


 


Hct  42.7  (42-50)  %


 


MCV  100.2 H  ()  fl


 


MCH  33.3 H  (26-32)  pg


 


MCHC  33.3  (32-36)  g/dl


 


RDW  12.8  (11.5-14.0)  %


 


Plt Count  292  (150-450)  K/mm3


 


MPV  9.0  (7.5-11.0)  fl


 


Gran %  72.7 H  (36.0-66.0)  %


 


Eos # (Auto)  0.04  (0-0.5)  


 


Absolute Lymphs (auto)  1.19  (1.0-4.6)  


 


Absolute Monos (auto)  0.44  (0.0-1.3)  


 


Lymphocytes %  19.1 L  (24.0-44.0)  %


 


Monocytes %  7.1  (0.0-12.0)  %


 


Eosinophils %  0.6  (0.00-5.0)  %


 


Basophils %  0.5  (0.0-0.4)  %


 


Absolute Granulocytes  4.54  (1.4-6.9)  


 


Basophils #  0.03  (0-0.4)  


 


ESR  6  (0-15)  mm/hr


 


Sodium   (137-145)  mmol/L


 


Potassium   (3.5-5.1)  mmol/L


 


Chloride   ()  mmol/L


 


Carbon Dioxide   (22-30)  mmol/L


 


Anion Gap   (5-15)  MEQ/L


 


BUN   (9-20)  mg/dL


 


Creatinine   (0.66-1.25)  mg/dL


 


Estimated GFR   ML/MIN


 


Glucose   ()  mg/dL


 


Lactic Acid   (0.4-2.0)  


 


Calcium   (8.4-10.2)  mg/dL


 


Total Bilirubin   (0.2-1.3)  mg/dL


 


AST   (17-59)  U/L


 


ALT   (0-50)  U/L


 


Alkaline Phosphatase   ()  U/L


 


Troponin I   (0.000-0.034)  ng/mL


 


Serum Total Protein   (6.3-8.2)  g/dL


 


Albumin   (3.5-5.0)  g/dL














- Progress


Progress: improved





- Departure


Departure Disposition: Home (Patient was offered a opportunity for admission and

 refused family members assure me they will be available for him to have 

assistance and prevent falls)


Clinical Impression: 


 Vertigo





Condition: Stable


Critical Care Time: No


Referrals: 


HARRY HEIN [Primary Care Provider] - 


Instructions:  Vertigo (a Type of Dizziness) (DC)


Prescriptions: 


Meclizine HCl 25 mg*** [Antivert 25 mg***] 25 mg PO TID 10 Days #30 tablet


Diazepam 5 mg*** [Valium 5 MG***] 2.5 mg PO TID PRN 7 Days #10 tablet


 PRN Reason: Nausea